# Patient Record
Sex: FEMALE | Race: OTHER | NOT HISPANIC OR LATINO | URBAN - METROPOLITAN AREA
[De-identification: names, ages, dates, MRNs, and addresses within clinical notes are randomized per-mention and may not be internally consistent; named-entity substitution may affect disease eponyms.]

---

## 2023-12-14 ENCOUNTER — TELEPHONE (OUTPATIENT)
Dept: PRIMARY CARE | Facility: CLINIC | Age: 23
End: 2023-12-14
Payer: COMMERCIAL

## 2023-12-14 NOTE — TELEPHONE ENCOUNTER
Pt tested positive for Covid 2 weeks ago and called today to  say she tested positive 2 weeks ago, but needs a letter for School and work  excusing her for being out for 2 weeks. I  Informed pt that you may not do the letter giving we were not informed when she first tested positive also you have not seen her in over a year but I would check with you to see what you said

## 2024-01-08 ENCOUNTER — APPOINTMENT (OUTPATIENT)
Dept: DERMATOLOGY | Facility: CLINIC | Age: 24
End: 2024-01-08
Payer: COMMERCIAL

## 2024-01-31 ENCOUNTER — OFFICE VISIT (OUTPATIENT)
Dept: PRIMARY CARE | Facility: CLINIC | Age: 24
End: 2024-01-31
Payer: COMMERCIAL

## 2024-01-31 VITALS
WEIGHT: 151.6 LBS | HEART RATE: 66 BPM | DIASTOLIC BLOOD PRESSURE: 78 MMHG | TEMPERATURE: 97.7 F | BODY MASS INDEX: 22.97 KG/M2 | SYSTOLIC BLOOD PRESSURE: 110 MMHG | HEIGHT: 68 IN

## 2024-01-31 DIAGNOSIS — Z20.2 STD EXPOSURE: ICD-10-CM

## 2024-01-31 DIAGNOSIS — Z01.419 ENCOUNTER FOR GYNECOLOGICAL EXAMINATION WITHOUT ABNORMAL FINDING: Primary | ICD-10-CM

## 2024-01-31 DIAGNOSIS — Z00.00 ROUTINE GENERAL MEDICAL EXAMINATION AT A HEALTH CARE FACILITY: ICD-10-CM

## 2024-01-31 DIAGNOSIS — E55.9 VITAMIN D DEFICIENCY: ICD-10-CM

## 2024-01-31 LAB
25(OH)D3 SERPL-MCNC: 21 NG/ML (ref 30–100)
ALBUMIN SERPL BCP-MCNC: 4.5 G/DL (ref 3.4–5)
ALP SERPL-CCNC: 46 U/L (ref 33–110)
ALT SERPL W P-5'-P-CCNC: 12 U/L (ref 7–45)
ANION GAP SERPL CALC-SCNC: 12 MMOL/L (ref 10–20)
AST SERPL W P-5'-P-CCNC: 17 U/L (ref 9–39)
BASOPHILS # BLD AUTO: 0.03 X10*3/UL (ref 0–0.1)
BASOPHILS NFR BLD AUTO: 0.6 %
BILIRUB SERPL-MCNC: 1.2 MG/DL (ref 0–1.2)
BUN SERPL-MCNC: 8 MG/DL (ref 6–23)
CALCIUM SERPL-MCNC: 9.5 MG/DL (ref 8.6–10.6)
CHLORIDE SERPL-SCNC: 104 MMOL/L (ref 98–107)
CHOLEST SERPL-MCNC: 161 MG/DL (ref 0–199)
CHOLESTEROL/HDL RATIO: 3.5
CO2 SERPL-SCNC: 25 MMOL/L (ref 21–32)
CREAT SERPL-MCNC: 0.6 MG/DL (ref 0.5–1.05)
EGFRCR SERPLBLD CKD-EPI 2021: >90 ML/MIN/1.73M*2
EOSINOPHIL # BLD AUTO: 0.29 X10*3/UL (ref 0–0.7)
EOSINOPHIL NFR BLD AUTO: 5.9 %
ERYTHROCYTE [DISTWIDTH] IN BLOOD BY AUTOMATED COUNT: 13.6 % (ref 11.5–14.5)
GLUCOSE SERPL-MCNC: 73 MG/DL (ref 74–99)
HBV SURFACE AG SERPL QL IA: NONREACTIVE
HCT VFR BLD AUTO: 35.3 % (ref 36–46)
HCV AB SER QL: NONREACTIVE
HDLC SERPL-MCNC: 45.5 MG/DL
HGB BLD-MCNC: 11.6 G/DL (ref 12–16)
IMM GRANULOCYTES # BLD AUTO: 0 X10*3/UL (ref 0–0.7)
IMM GRANULOCYTES NFR BLD AUTO: 0 % (ref 0–0.9)
LDLC SERPL CALC-MCNC: 104 MG/DL
LYMPHOCYTES # BLD AUTO: 1.65 X10*3/UL (ref 1.2–4.8)
LYMPHOCYTES NFR BLD AUTO: 33.6 %
MCH RBC QN AUTO: 27 PG (ref 26–34)
MCHC RBC AUTO-ENTMCNC: 32.9 G/DL (ref 32–36)
MCV RBC AUTO: 82 FL (ref 80–100)
MONOCYTES # BLD AUTO: 0.33 X10*3/UL (ref 0.1–1)
MONOCYTES NFR BLD AUTO: 6.7 %
NEUTROPHILS # BLD AUTO: 2.61 X10*3/UL (ref 1.2–7.7)
NEUTROPHILS NFR BLD AUTO: 53.2 %
NON HDL CHOLESTEROL: 116 MG/DL (ref 0–149)
NRBC BLD-RTO: 0 /100 WBCS (ref 0–0)
PLATELET # BLD AUTO: 263 X10*3/UL (ref 150–450)
POTASSIUM SERPL-SCNC: 4.2 MMOL/L (ref 3.5–5.3)
PREGNANCY TEST URINE, POC: NEGATIVE
PROT SERPL-MCNC: 7.3 G/DL (ref 6.4–8.2)
RBC # BLD AUTO: 4.29 X10*6/UL (ref 4–5.2)
SODIUM SERPL-SCNC: 137 MMOL/L (ref 136–145)
TREPONEMA PALLIDUM IGG+IGM AB [PRESENCE] IN SERUM OR PLASMA BY IMMUNOASSAY: NONREACTIVE
TRIGL SERPL-MCNC: 56 MG/DL (ref 0–149)
TSH SERPL-ACNC: 1.98 MIU/L (ref 0.44–3.98)
VLDL: 11 MG/DL (ref 0–40)
WBC # BLD AUTO: 4.9 X10*3/UL (ref 4.4–11.3)

## 2024-01-31 PROCEDURE — 99395 PREV VISIT EST AGE 18-39: CPT | Performed by: FAMILY MEDICINE

## 2024-01-31 PROCEDURE — 82306 VITAMIN D 25 HYDROXY: CPT

## 2024-01-31 PROCEDURE — 81025 URINE PREGNANCY TEST: CPT | Performed by: FAMILY MEDICINE

## 2024-01-31 PROCEDURE — 84443 ASSAY THYROID STIM HORMONE: CPT

## 2024-01-31 PROCEDURE — 86803 HEPATITIS C AB TEST: CPT

## 2024-01-31 PROCEDURE — 87340 HEPATITIS B SURFACE AG IA: CPT

## 2024-01-31 PROCEDURE — 87389 HIV-1 AG W/HIV-1&-2 AB AG IA: CPT

## 2024-01-31 PROCEDURE — 86780 TREPONEMA PALLIDUM: CPT

## 2024-01-31 PROCEDURE — 1036F TOBACCO NON-USER: CPT | Performed by: FAMILY MEDICINE

## 2024-01-31 PROCEDURE — 80061 LIPID PANEL: CPT

## 2024-01-31 PROCEDURE — 87800 DETECT AGNT MULT DNA DIREC: CPT

## 2024-01-31 PROCEDURE — 80053 COMPREHEN METABOLIC PANEL: CPT

## 2024-01-31 PROCEDURE — 85025 COMPLETE CBC W/AUTO DIFF WBC: CPT

## 2024-01-31 PROCEDURE — 36415 COLL VENOUS BLD VENIPUNCTURE: CPT

## 2024-01-31 NOTE — PATIENT INSTRUCTIONS
You mention that you have severe cramps and lower back pain during your menstrual    It can be that you are not physically active and you are producing too much prostaglandin during your menstrual and that can be fixed to a great extent by being more physically active meaning good aerobic exercise at least jogging or going on the elliptical for 45 minutes to 1 hour for 5 days a week    My other thought is you might be having endometriosis     Please asked the question from the gynecologist whether there is not any indication that you may be suffering from endometriosis since you have severe cramps and lower back pain with your menstrual

## 2024-01-31 NOTE — PROGRESS NOTES
"Subjective   Patient ID: Colleen Salomon is a 23 y.o. female who presents for Annual Exam.    HPI     Review of Systems    Objective   /78   Pulse 66   Temp 36.5 °C (97.7 °F)   Ht 1.73 m (5' 8.11\")   Wt 68.8 kg (151 lb 9.6 oz)   LMP 01/06/2024   BMI 22.98 kg/m²     Physical Exam  Cardiovascular:      Rate and Rhythm: Normal rate.   Pulmonary:      Breath sounds: Normal breath sounds.   Abdominal:      Palpations: Abdomen is soft.   Musculoskeletal:         General: Normal range of motion.      Cervical back: Normal range of motion.   Skin:     General: Skin is warm.   Neurological:      General: No focal deficit present.      Mental Status: She is alert.   Psychiatric:         Mood and Affect: Mood normal.         Assessment/Plan       This is a 23-year-old female patient here for her annual physical    We talked about safe sex and I referred her to gynecology    She has severe dysmenorrhea with back pain I educated the patient to find out from the gynecologist whether she should be ruled out for endometriosis    She will be seeing a dermatologist for her acne    Routine labs were drawn she also wanted a pregnancy test which came back negative         "

## 2024-02-01 LAB
C TRACH RRNA SPEC QL NAA+PROBE: NEGATIVE
HIV 1+2 AB+HIV1 P24 AG SERPL QL IA: NONREACTIVE
N GONORRHOEA DNA SPEC QL PROBE+SIG AMP: NEGATIVE

## 2024-02-12 ENCOUNTER — APPOINTMENT (OUTPATIENT)
Dept: DERMATOLOGY | Facility: CLINIC | Age: 24
End: 2024-02-12
Payer: COMMERCIAL

## 2024-02-28 ENCOUNTER — OFFICE VISIT (OUTPATIENT)
Dept: DERMATOLOGY | Facility: CLINIC | Age: 24
End: 2024-02-28
Payer: COMMERCIAL

## 2024-02-28 DIAGNOSIS — L70.0 ACNE VULGARIS: Primary | ICD-10-CM

## 2024-02-28 PROCEDURE — 1036F TOBACCO NON-USER: CPT | Performed by: DERMATOLOGY

## 2024-02-28 PROCEDURE — 99214 OFFICE O/P EST MOD 30 MIN: CPT | Performed by: DERMATOLOGY

## 2024-02-28 RX ORDER — DAPSONE 75 MG/G
GEL TOPICAL
Qty: 60 G | Refills: 3 | Status: SHIPPED | OUTPATIENT
Start: 2024-02-28

## 2024-02-28 RX ORDER — SPIRONOLACTONE 50 MG/1
TABLET, FILM COATED ORAL
Qty: 30 TABLET | Refills: 3 | Status: CANCELLED | OUTPATIENT
Start: 2024-02-28

## 2024-02-28 RX ORDER — AZELAIC ACID 0.15 G/G
GEL TOPICAL
Qty: 50 G | Refills: 11 | Status: SHIPPED | OUTPATIENT
Start: 2024-02-28

## 2024-02-28 RX ORDER — SPIRONOLACTONE 50 MG/1
TABLET, FILM COATED ORAL
Qty: 30 TABLET | Refills: 3 | Status: SHIPPED | OUTPATIENT
Start: 2024-02-28

## 2024-02-28 RX ORDER — VALACYCLOVIR HYDROCHLORIDE 1 G/1
TABLET, FILM COATED ORAL
COMMUNITY
Start: 2020-10-15

## 2024-02-28 RX ORDER — CLASCOTERONE 1 G/100G
CREAM TOPICAL
Qty: 60 G | Refills: 11 | Status: CANCELLED | OUTPATIENT
Start: 2024-02-28

## 2024-02-28 ASSESSMENT — ITCH NUMERIC RATING SCALE: HOW SEVERE IS YOUR ITCHING?: 0

## 2024-02-28 NOTE — PROGRESS NOTES
Navin Salomon is a 23 y.o. female who presents for the following: Acne (Face/back/chest- pt states has tried in the past-bp wash, clinda lotion, tretinoin and clinda bp gel with no response. States currently using azelaic acid and xinc pyrithione 0.25% from [facility]. Pt states has improved dark spots but hasn't helped with the acne. ).     Review of Systems:  No other skin or systemic complaints other than what is documented elsewhere in the note.    The following portions of the chart were reviewed this encounter and updated as appropriate:   Tobacco  Allergies  Meds  Problems  Med Hx  Surg Hx  Fam Hx         Skin Cancer History  No skin cancer on file.      Specialty Problems    None       Objective   Well appearing patient in no apparent distress; mood and affect are within normal limits.    A focused skin examination was performed. All findings within normal limits unless otherwise noted below.    Assessment/Plan   1. Acne vulgaris  Head - Anterior (Face)  Full face with numerous small inflammatory papules, some comedonal; PIH    Inflammatory acne, likely with hormonal component (worsens prior to menses, recalcitrant to therapy). Patient had acne in thor school, which was well controlled with azelaic acid/clindamycin/niacin previously. Then acne recurred a few years ago, and patient tried BP wash, clindamycin lotion and tretinoin 0.025% for greater than 1 year without response, then tried reduced strength tretinoin 0.018% and this only resulted in worsening of acne.  For the past few months patient has been on azelaic acid/clinda/zinc pyrithione from [facility] with mild improvement but still with significant active acne.  -Discussed the nature of the condition  -Discussed treatment options  -Avoid tretinoin in this patient given past intolerance and ineffectiveness  -Recommend to begin topical azelaic acid 15% once daily  -Recommend to begin topical dapsone 7.5% once daily  -Recommend to  start spironolactone 50mg at bedtime as tolerated      -Discussed/information given on the potential adverse effects of spironolactone including but not limited to hypotension, diuresis, muscle cramps, fatigue, breast tenderness, menstrual irregularities, hyperkalemia which may result in muscle dysfunction (although current evidence suggests no need to monitor potassium levels in young healthy adult females).   -Discussed common side effects of lightheadedness or dizziness, which usually resolve within a few days as the body adjusts to the medication if fluid intake is appropriate. Discussed if the patient is feeling unwell or fainting, to discontinue the medication and contact our office.  -Discussed not to take any prescription potassium supplements while taking spironolactone, and to avoid excessive consumption of food/drink containing potassium (such as coconut water).  -There is a black box warning of tumorigenesis in rodents taking very high doses of spironolactone. Epidemiologic studies have shown no increase in malignancy in humans.   -If patient is of child bearing potential, patient counseled that they should NOT get pregnant while on this medication as there is risk of teratogenicity/birth defects and needs to use contraception while taking this medication      spironolactone (Aldactone) 50 mg tablet - Head - Anterior (Face)  Take one pill by mouth at bedtime as tolerated    Related Medications  azelaic acid (Finacea) 15 % gel  Apply to affected areas once daily    dapsone 7.5 % gel with pump  Apply to affected areas once daily        Follow up in 3 months for acne  Discussed if there are any changes or development of concerning symptoms (lesion/skin condition is changing, bleeding, enlarging, or worsening) the patient is to contact my office. The patient verbalizes understanding.    Shey Grover MD  2/28/2024

## 2024-03-04 ENCOUNTER — APPOINTMENT (OUTPATIENT)
Dept: AUDIOLOGY | Facility: CLINIC | Age: 24
End: 2024-03-04
Payer: COMMERCIAL

## 2024-03-04 ENCOUNTER — TELEPHONE (OUTPATIENT)
Dept: DERMATOLOGY | Facility: CLINIC | Age: 24
End: 2024-03-04

## 2024-03-04 ENCOUNTER — APPOINTMENT (OUTPATIENT)
Dept: OTOLARYNGOLOGY | Facility: CLINIC | Age: 24
End: 2024-03-04
Payer: COMMERCIAL

## 2024-03-04 NOTE — TELEPHONE ENCOUNTER
Pt left VM stating that she will be fasting for one month for [employer] reasons starting next Monday, so she will not be able to eat or drink from sunrise to sunset and would like to know if she should hold off on starting the Spironolactone until her fasting period ends. Please advise?     Thanks!  Azul Taylor

## 2024-03-05 ENCOUNTER — TELEPHONE (OUTPATIENT)
Dept: DERMATOLOGY | Facility: CLINIC | Age: 24
End: 2024-03-05
Payer: COMMERCIAL

## 2024-03-05 NOTE — TELEPHONE ENCOUNTER
Pt was contacted at this time and was made aware that pharmacy was notified to fill medications as well.  Pt was told that she can wait to start spironolactone until after her fast.    Dalton Mariscal LPN

## 2024-03-05 NOTE — TELEPHONE ENCOUNTER
----- Message from Azul Taylor RN sent at 3/4/2024  3:18 PM EST -----  Regarding: Topicals  Hi!     This patient called regarding her topicals that were prescribed by Dr. Berrios- Dapcarey and Azelaic acid- she stated that per the pharmacy they are delayed. Did these require prior auths and do you know if they were approved?    Thanks!!!  Azul Taylor RN     The prior auths for these medications have been completed. Pharmacy was called and notified that these medications can be filled.     Dalton Mariscal LPN

## 2024-03-06 ENCOUNTER — TELEPHONE (OUTPATIENT)
Dept: DERMATOLOGY | Facility: CLINIC | Age: 24
End: 2024-03-06
Payer: COMMERCIAL

## 2024-03-06 NOTE — TELEPHONE ENCOUNTER
Pt contacted office and left message with questions regarding medications.  This nurse was able to call patient back and discuss these questions. All questions answered appropriately. No further questions noted.     Dalton Mariscal LPN

## 2024-06-04 ENCOUNTER — OFFICE VISIT (OUTPATIENT)
Dept: OTOLARYNGOLOGY | Facility: CLINIC | Age: 24
End: 2024-06-04
Payer: COMMERCIAL

## 2024-06-04 ENCOUNTER — TELEPHONE (OUTPATIENT)
Dept: DERMATOLOGY | Facility: CLINIC | Age: 24
End: 2024-06-04

## 2024-06-04 ENCOUNTER — CLINICAL SUPPORT (OUTPATIENT)
Dept: AUDIOLOGY | Facility: CLINIC | Age: 24
End: 2024-06-04
Payer: COMMERCIAL

## 2024-06-04 VITALS — SYSTOLIC BLOOD PRESSURE: 112 MMHG | DIASTOLIC BLOOD PRESSURE: 61 MMHG | TEMPERATURE: 97.2 F

## 2024-06-04 DIAGNOSIS — H93.293 ABNORMAL AUDITORY PERCEPTION OF BOTH EARS: ICD-10-CM

## 2024-06-04 DIAGNOSIS — H93.13 TINNITUS OF BOTH EARS: ICD-10-CM

## 2024-06-04 DIAGNOSIS — H93.8X3 EAR FULLNESS, BILATERAL: Primary | ICD-10-CM

## 2024-06-04 DIAGNOSIS — M26.609 TMJ DYSFUNCTION: ICD-10-CM

## 2024-06-04 DIAGNOSIS — H61.22 LEFT EAR IMPACTED CERUMEN: Primary | ICD-10-CM

## 2024-06-04 PROCEDURE — 92550 TYMPANOMETRY & REFLEX THRESH: CPT | Performed by: AUDIOLOGIST

## 2024-06-04 PROCEDURE — 92557 COMPREHENSIVE HEARING TEST: CPT | Performed by: AUDIOLOGIST

## 2024-06-04 ASSESSMENT — PATIENT HEALTH QUESTIONNAIRE - PHQ9
SUM OF ALL RESPONSES TO PHQ9 QUESTIONS 1 AND 2: 0
1. LITTLE INTEREST OR PLEASURE IN DOING THINGS: NOT AT ALL
2. FEELING DOWN, DEPRESSED OR HOPELESS: NOT AT ALL

## 2024-06-04 ASSESSMENT — ENCOUNTER SYMPTOMS: OCCASIONAL FEELINGS OF UNSTEADINESS: 0

## 2024-06-04 ASSESSMENT — PAIN SCALES - GENERAL: PAINLEVEL_OUTOF10: 0 - NO PAIN

## 2024-06-04 ASSESSMENT — PAIN - FUNCTIONAL ASSESSMENT: PAIN_FUNCTIONAL_ASSESSMENT: 0-10

## 2024-06-04 NOTE — PROGRESS NOTES
Patient ID: Colleen Salomon is a 23 y.o. female who presents for the evaluation of hearing difficulty in both ears.     PROVIDER IMPRESSIONS:  DIAGNOSES/PROBLEMS:  -TMJ dysfunction  -Left cerumen impaction    ASSESSMENT:   Colleen Salomon is a pleasant 23 y.o. female who presents with symptoms of bilateral hearing difficulty, intermittent bilateral aural fullness/pressure, intermittent bilateral ear itching, and intermittent bilateral non-pulsatile tinnitus. Based on the clinical information provided, symptoms and clinical exam findings are consistent with TMJ dysfunction and left cerumen impaction. Reassurance provided to patient that exam today showed no evidence of acute infection or inflammation in the EAC bilaterally and that TM appears with no evidence of infection, effusion, retraction or perforation bilaterally.  Audiogram reviewed in detail with the patient, which revealed normal hearing function results throughout with excellent word discrimination bilaterally. The etiology of temporomandibular joint disorders (TMD) was discussed in detail with patient including: structural issues such as alterations in dental occlusion (the alignment of the upper and lower teeth) that affect maxillomandibular position and function. These issues may or may not be associated with joint trauma, poor posture or cervicogenic etiologies. Possible psychologic factors include anxiety, depression and PTSD. Multiple other contributing and comorbid factors, including biological, behavioral, environmental, and cognitive disorders which can all contribute to the development of symptoms were also reviewed with the patient.      PLAN:  I counseled patient on safe interventions for cerumen management at home. Patient may wash the external ear with a cloth. I reinforced education to the patient that they should avoid using cotton tipped applicators, tissues, paper, or any rigid object in the ear canal. I explained to the patient that doing so  may cause wax to be pushed back into the ear canal and stuck and also risks injury to the ear canal and ear drum.   Patient advised to wear ear hearing protection while in the presence of loud sounds. The patient was also counseled to use effective communication strategies such as asking the speaker to gain attention prior to speaking, speaking in the same room, repeating words that were heard, etc.   I recommended patient initiates TMJ home management strategies which were reviewed in detail.   Follow-up: Patient may schedule for follow-up as needed. They may follow-up sooner, if needed. Patient is agreeable to this plan, all questions were answered to patient's satisfaction.     Subjective   HPI: Colleen Salomon is a 23 y.o. female who presents for the evaluation of bilateral hearing difficulty.  The patient states that symptom onset began a couple of months ago. Reports that she is currently in pre-dentistry track for college at [employer] and has noticed she cannot hear words as well as others when communicating, especially in loud/noisy environments. Associated symptoms include intermittent bilateral ear itching, intermittent ear fullness/pressure in alternating ears and occasional non-pulsatile ringing/buzzing sound in the ears for the past few months which is non-bothersome. Denies the presence of ear pain, ear drainage, autophony, dizziness or vertigo. Mentions history of known teeth-grinding which she has been more aware of trying to avoid throughout the day in the past few months. When asked about pertinent otologic history, the patient denies a history of recurrent ear infections, denies history of ear surgery, denies history of PE tube insertion, and denies history of prolonged/traumatic loud noise exposure. The patient does insert Q-tips in the ear canals once weekly for ear itching, and  denies insertion of other foreign objects into the ear canals. The patient denies history of wearing  hearing aid devices. The patient does not endorse a family history of hearing loss. Patient was previously established with Dr. Jonathon Frankel and underwent OSR in March 2021.    PATIENT HISTORY:  No past medical history on file.   No past surgical history on file.   No Known Allergies     Current Outpatient Medications:     azelaic acid (Finacea) 15 % gel, Apply to affected areas once daily, Disp: 50 g, Rfl: 11    dapsone 7.5 % gel with pump, Apply to affected areas once daily, Disp: 60 g, Rfl: 3    spironolactone (Aldactone) 50 mg tablet, Take one pill by mouth at bedtime as tolerated, Disp: 30 tablet, Rfl: 3    valACYclovir (Valtrex) 1 gram tablet, Take by mouth once daily., Disp: , Rfl:    Tobacco Use: Low Risk  (2/28/2024)    Patient History     Smoking Tobacco Use: Never     Smokeless Tobacco Use: Never     Passive Exposure: Not on file      Alcohol Use: Not At Risk (10/11/2019)    Received from [facility]    AUDIT-C     Frequency of Alcohol Consumption: Never     Average Number of Drinks: Not on file     Frequency of Binge Drinking: Not on file      Social History     Substance and Sexual Activity   Drug Use Never        Review of Systems   All other systems negative.     Objective   Visit Vitals  OB Status Having periods   Smoking Status Never        PHYSICAL EXAM:  General appearance: Appears well, well-nourished, well groomed. No acute distress.   Constitutional: No fever, chills, weight loss or weight gain.  Communication: Normal communication  Psychiatric: Oriented to person, place and time. Normal mood and affect.  Neurologic: Cranial nerves II-XII grossly intact and symmetric bilaterally.  Cardiovascular: Examination of peripheral vascular system shows no clubbing or cyanosis.  Respiratory: No respiratory distress increased work of breathing. Inspection of the chest with symmetric chest expansion and normal respiratory effort.  Skin: No head and neck rashes.  Head: Normocephalic. Atraumatic  with no masses, lesions or scarring.  Face: Normal symmetry. No scars or deformities.  Eyes: Conjunctiva not edematous or erythematous. PERRLA  Neck: Supple and symmetric, trachea midline. Lymph nodes with no adenopathy.  Head: Normocephalic. Atraumatic with no masses, lesions or scarring.  Eyes: PERRL, EOMI, Conjunctiva is clear. No nystagmus.  Nose: External inspection of nose: No nasal lesions, lacerations or scars.  Throat:  Floor of mouth is clear, no masses.  Tongue appears normal, no lesions or masses. Gums, gingiva, buccal mucosa appear pink and moist, no lesions. Teeth are in intact.    Oropharynx: No lesions. Retropharyngeal wall is flat.  Salivary Glands: Symmetric bilaterally.  No palpable masses.  No evidence of acute infection or salivary stones.  TMJ: Normal, no trismus.  Right Ear: External inspection of ear with no deformity, scars, or masses. Mastoid is nontender. External auditory canal is clear. TM is intact with no sign of infection, effusion, or retraction.  No perforation seen. Auto insufflation visible under microscopy.  Left Ear: External inspection of ear with no deformity, scars, or masses. Mastoid is nontender. External auditory canal is partially impacted with cerumen. Unable to visualize TM.     RESULTS:  I personally reviewed the patient's audiogram today from 6/4/24, which showed: Normal hearing across all frequencies in bilateral ears. Normal tympanogram bilaterally. Excellent word discrimination bilaterally. Acoustic reflexes present right ipsilateral, and left ipsilateral. Bilateral OAEs present and robust from 0918-7477 Hz.     Procedures   EAR CLEANING PROCEDURE NOTE:  Indication: Cerumen impaction  Location: left ear canals  Procedure Note: The procedure was performed by the provider.  Visualization Instrument: A microscope with a #5 speculum was placed in the ear canals to visualize the ear canal debris.  Ear Cleaning Instrument and Outcome: Using the 5 suction, a small amount  of soft, yellow cerumen was removed from the impacted EAC(s).  Post-Procedure/Microscopic Otologic Exam:  Left Ear-- TM is intact with no sign of infection, effusion, or retraction.  No perforation seen. EAC is clear. Auto insufflation visible under microscopy.  Patient Status: The patient tolerated the procedure well.  Complications: There were no complications.     Amanda Blank, APRN-CNP

## 2024-06-04 NOTE — TELEPHONE ENCOUNTER
Pt contacted office at this time and states that she is unable to come to her appointment on 06/05/2024.  Pt has already cancelled appointment and has rescheduled to see Dr. Weathers in August.  Pt would like to have [facility] staff contact patient to reschedule with Dr. Berrios if possible to a sooner appointment.    Can someone please reach out to patient to schedule appointment with Dr. Yash Grover?    Dalton Mariscal LPN

## 2024-06-04 NOTE — PROGRESS NOTES
AUDIOLOGY ADULT AUDIOMETRIC EVALUATION      Name:  Colleen Salomon  :  2000  Age:  23 y.o.  Date of Evaluation: 24    History:  Reason for visit:  Ms. Salomon was seen today as part of the visit with CRISTHIAN Caceres  for an evaluation of hearing.   Chief Complaint   Patient presents with    Hearing Problem      Stated she has been concerned for a gradual hearing loss, occurring within the past few months. Stated she has noticed difficulty hearing while attending college. Stated she has been asking speakers to repeat and has been missing words. She has noticed difficulty hearing in the presence of background noise, however, has noticed some problems in quiet.   Reported occasional sensations of bilateral ear fullness and popping sounds in the ear.   Stated she has noticed some slight dizziness, however, not vertigo or recent falls.   Denied any current otalgia, aural fullness, tinnitus, ear pressure, ear surgery, recent ear/sinus infections, recent falls, significant noise exposure, sinus/throat concerns, ear drainage, or sudden hearing loss.    EVALUATION     See Audiogram    RESULTS:    Otoscopic Evaluation:   Right Ear: Otoscopy revealed a clear healthy canal and a healthy tympanic membrane was visualized.   Left Ear:  Otoscopy revealed a clear healthy canal and a healthy tympanic membrane was visualized.     Immittance:  Immittance Measures: 226 Hz   Right Ear: Tympanometric testing revealed a normal type A tympanogram with normal middle ear pressure and normal static compliance.  Left Ear: Tympanometric testing revealed a normal type A tympanogram with normal middle ear pressure and normal static compliance.    Ipsilateral acoustic reflexes were present at, 500-4,000 Hz, at expected sensation levels.  Ipsilateral acoustic reflexes were present at, 500-4,000 Hz, at expected sensation levels.    Test technique:  Pure Tone Audiometry via TDH headphones    Reliability:   excellent    Pure Tone  Audiometry:    Right Ear: Audiometric testing indicated normal peripheral hearing sensitivity from 125-8,000 Hz.  Left Ear:   Audiometric testing indicated normal peripheral hearing sensitivity from 125-8,000 Hz.      Speech Audiometry:   Right Ear:  Speech Reception Threshold (SRT) was obtained at 10 dBHL                 Word Recognition scores were excellent (100%) in quiet when words were presented at 50 dBHL  Left Ear:  Speech Reception Threshold (SRT) was obtained at 5 dBHL                 Word Recognition scores were excellent (100%) in quiet when words were presented at 45 dBHL  Testing was performed with recorded NU-6 speech words in quiet. Speech thresholds were in good agreement with the pure tone averages in each ear.     Distortion Product Otoacoustic Emissions:  Right Ear: Distortion product otoacoustic emissions were present and robust from 1,000-8,000 Hz, indicating normal to near normal cochlear outer hair cell function at these frequencies.  Left Ear:  Distortion product otoacoustic emissions were present and robust from 1,000-8,000 Hz, indicating normal to near normal cochlear outer hair cell function at these frequencies.  Present OAEs suggest normal cochlear outer hair cell function.  Absent OAEs are consistent with some degree of hearing loss.    IMPRESSIONS:  Today's test results are consistent with normal peripheral hearing sensitivity, bilaterally.  The patient was counseled with regard to the findings.    RECOMMENDATIONS:  * Continue medical follow up with CRISTHIAN Caceres.  * Retest as medically indicated, or if future concerns for hearing sensitivity arise.   * Wear hearing protection while in the presence of loud sounds.   * Use effective communication strategies such as asking the speaker to gain attention prior to speaking, speaking in the same room, repeating words that were heard, etc.    PATIENT EDUCATION:   Discussed results and recommendations with the patient and a  copy of the hearing test was provided.  Questions were addressed and the patient was encouraged to contact our department should concerns arise.  The patient was seen from  10:30-11:00 am.

## 2024-06-05 ENCOUNTER — APPOINTMENT (OUTPATIENT)
Dept: DERMATOLOGY | Facility: CLINIC | Age: 24
End: 2024-06-05
Payer: COMMERCIAL

## 2024-06-10 ENCOUNTER — APPOINTMENT (OUTPATIENT)
Dept: DERMATOLOGY | Facility: CLINIC | Age: 24
End: 2024-06-10
Payer: COMMERCIAL

## 2024-07-04 DIAGNOSIS — L70.0 ACNE VULGARIS: ICD-10-CM

## 2024-07-05 RX ORDER — SPIRONOLACTONE 50 MG/1
TABLET, FILM COATED ORAL
Qty: 30 TABLET | Refills: 0 | Status: SHIPPED | OUTPATIENT
Start: 2024-07-05

## 2024-08-13 ENCOUNTER — APPOINTMENT (OUTPATIENT)
Dept: DERMATOLOGY | Facility: CLINIC | Age: 24
End: 2024-08-13
Payer: COMMERCIAL

## 2024-08-15 ENCOUNTER — TELEPHONE (OUTPATIENT)
Dept: DERMATOLOGY | Facility: CLINIC | Age: 24
End: 2024-08-15
Payer: COMMERCIAL

## 2024-08-15 NOTE — TELEPHONE ENCOUNTER
LVM for pt at this time to notify her that appointment is needed in order to receive refills. Informed pt that scheduling will reach out to her to schedule an appointment with Dr. Berrios. Call back number left.     Azul Taylor RN

## 2024-08-15 NOTE — TELEPHONE ENCOUNTER
Pt contacted office requesting a refill of spironolactone to a new pharmacy. Pt was last seen 02/28/2024 and advised to follow up in 3 months. Pt cancelled follow up scheduled for 06/05, 06/10 and no showed appointment on 08/13 with Dr. Weathers. No current follow up scheduled at this time.     Can you please review and send refill to the [facility] in chart if appropriate?     Thanks!  Azul Taylor RN

## 2024-08-20 ENCOUNTER — APPOINTMENT (OUTPATIENT)
Dept: DERMATOLOGY | Facility: CLINIC | Age: 24
End: 2024-08-20
Payer: COMMERCIAL

## 2024-08-20 DIAGNOSIS — L70.0 ACNE VULGARIS: Primary | ICD-10-CM

## 2024-08-20 PROCEDURE — 1036F TOBACCO NON-USER: CPT | Performed by: DERMATOLOGY

## 2024-08-20 PROCEDURE — 99214 OFFICE O/P EST MOD 30 MIN: CPT | Performed by: DERMATOLOGY

## 2024-08-20 RX ORDER — SPIRONOLACTONE 50 MG/1
TABLET, FILM COATED ORAL
Qty: 45 TABLET | Refills: 5 | Status: SHIPPED | OUTPATIENT
Start: 2024-08-20

## 2024-08-20 NOTE — PROGRESS NOTES
Subjective     Colleen Salomon is a 23 y.o. female who presents for the following: Acne (Pt presents for acne follow up. Pt states she is continuing to use the azelaic acid and dapsone with good response. She states she is continuing to take the spironolactone as prescribed. She states she feels that her skin as cleared up but she does continue to get break outs around her nose/cheeks. ).     Review of Systems:  No other skin or systemic complaints other than what is documented elsewhere in the note.    The following portions of the chart were reviewed this encounter and updated as appropriate:   Tobacco  Allergies  Meds  Problems  Med Hx  Surg Hx  Fam Hx         Skin Cancer History  No skin cancer on file.      Specialty Problems    None       Objective   Well appearing patient in no apparent distress; mood and affect are within normal limits.    A focused skin examination was performed. All findings within normal limits unless otherwise noted below.    Assessment/Plan   1. Acne vulgaris  Head - Anterior (Face)  Full face with a few inflammatory papules on lower face, some residual comedones; PIH    Improved but inadequately controlled  -Recommend to continue topical azelaic acid 15% once daily  -Recommend to continue topical dapsone 7.5% once daily  -Recommend to increase dose of spironolactone from 50mg daily to 75mg once daily as tolerated. Patient tolerating well without side effects.    Prior History:  Inflammatory acne, likely with hormonal component (worsens prior to menses, recalcitrant to therapy). Patient had acne in high school, which was well controlled with azelaic acid/clindamycin/niacin previously. Then acne recurred a few years ago, and patient tried BP wash, clindamycin lotion and tretinoin 0.025% for greater than 1 year without response, then tried reduced strength tretinoin 0.018% and this only resulted in worsening of acne.  For the past few months patient has been on azelaic  acid/clinda/zinc pyrithione from curology with mild improvement but still with significant active acne.  -Discussed the nature of the condition  -Discussed treatment options  -Avoid tretinoin in this patient given past intolerance and ineffectiveness    -Discussed/information given on the potential adverse effects of spironolactone including but not limited to hypotension, diuresis, muscle cramps, fatigue, breast tenderness, menstrual irregularities, hyperkalemia which may result in muscle dysfunction (although current evidence suggests no need to monitor potassium levels in young healthy adult females).   -Discussed common side effects of lightheadedness or dizziness, which usually resolve within a few days as the body adjusts to the medication if fluid intake is appropriate. Discussed if the patient is feeling unwell or fainting, to discontinue the medication and contact our office.  -Discussed not to take any prescription potassium supplements while taking spironolactone, and to avoid excessive consumption of food/drink containing potassium (such as coconut water).  -There is a black box warning of tumorigenesis in rodents taking very high doses of spironolactone. Epidemiologic studies have shown no increase in malignancy in humans.   -If patient is of child bearing potential, patient counseled that they should NOT get pregnant while on this medication as there is risk of teratogenicity/birth defects and needs to use contraception while taking this medication      Related Medications  azelaic acid (Finacea) 15 % gel  Apply to affected areas once daily    dapsone 7.5 % gel with pump  Apply to affected areas once daily    spironolactone (Aldactone) 50 mg tablet  Take one and a half pills (75mg) by mouth at bedtime as tolerated        Follow up in 6 months for acne  Discussed if there are any changes or development of concerning symptoms (lesion/skin condition is changing, bleeding, enlarging, or worsening) the  patient is to contact my office. The patient verbalizes understanding.    Shey Grover MD  8/20/2024

## 2025-02-24 ENCOUNTER — APPOINTMENT (OUTPATIENT)
Dept: DERMATOLOGY | Facility: CLINIC | Age: 25
End: 2025-02-24
Payer: COMMERCIAL

## 2025-02-24 DIAGNOSIS — L70.0 ACNE VULGARIS: Primary | ICD-10-CM

## 2025-02-24 DIAGNOSIS — B00.1 HERPES LABIALIS: ICD-10-CM

## 2025-02-24 DIAGNOSIS — L98.9 DERMATOLOGIC PROBLEM: ICD-10-CM

## 2025-02-24 DIAGNOSIS — L81.9 DYSCHROMIA: ICD-10-CM

## 2025-02-24 PROCEDURE — 99214 OFFICE O/P EST MOD 30 MIN: CPT | Performed by: DERMATOLOGY

## 2025-02-24 PROCEDURE — 1036F TOBACCO NON-USER: CPT | Performed by: DERMATOLOGY

## 2025-02-24 RX ORDER — SPIRONOLACTONE 100 MG/1
TABLET, FILM COATED ORAL
Qty: 90 TABLET | Refills: 1 | Status: SHIPPED | OUTPATIENT
Start: 2025-02-24

## 2025-02-24 RX ORDER — ACYCLOVIR 50 MG/G
OINTMENT TOPICAL
Qty: 15 G | Refills: 2 | Status: CANCELLED | OUTPATIENT
Start: 2025-02-24

## 2025-02-24 RX ORDER — ACYCLOVIR 50 MG/G
CREAM TOPICAL
Qty: 5 G | Refills: 2 | Status: CANCELLED | OUTPATIENT
Start: 2025-02-24

## 2025-02-24 RX ORDER — ACYCLOVIR 50 MG/G
OINTMENT TOPICAL
Qty: 15 G | Refills: 2 | Status: SHIPPED | OUTPATIENT
Start: 2025-02-24

## 2025-02-24 RX ORDER — VALACYCLOVIR HYDROCHLORIDE 1 G/1
TABLET, FILM COATED ORAL
Qty: 4 TABLET | Refills: 2 | Status: SHIPPED | OUTPATIENT
Start: 2025-02-24

## 2025-02-24 NOTE — PROGRESS NOTES
Subjective     Colleen Salomon is a 24 y.o. female who presents for the following: Acne (Pt states improving. Taking spirolactone and using azelaic acid & dapsone with good response. ) and Mouth Lesions (Pt states has a cold sore on lip, comes more frequently, every 2 months. Using otc liquid for treatment with no response. ).     Review of Systems:  No other skin or systemic complaints other than what is documented elsewhere in the note.    The following portions of the chart were reviewed this encounter and updated as appropriate:   Tobacco  Allergies  Meds  Problems  Med Hx  Surg Hx  Fam Hx                  Objective   Well appearing patient in no apparent distress; mood and affect are within normal limits.    A focused skin examination was performed. All findings within normal limits unless otherwise noted below.    Assessment/Plan   1. Acne vulgaris  Head - Anterior (Face)  Two resolving macules/PIH    Improved but inadequately controlled  -Recommend to continue topical azelaic acid 15% once daily  -Recommend to continue topical dapsone 7.5% once daily  -Recommend to increase dose of spironolactone from to 100 once daily as tolerated. Patient tolerating well without side effects.  -Patient may consider starting Vettered Retinoid 1-2 times per week    Prior History:  Inflammatory acne, likely with hormonal component (worsens prior to menses, recalcitrant to therapy). Patient had acne in high school, which was well controlled with azelaic acid/clindamycin/niacin previously. Then acne recurred a few years ago, and patient tried BP wash, clindamycin lotion and tretinoin 0.025% for greater than 1 year without response, then tried reduced strength tretinoin 0.018% and this only resulted in worsening of acne.  For the past few months patient has been on azelaic acid/clinda/zinc pyrithione from curology with mild improvement but still with significant active acne.  -Discussed the nature of the  condition  -Discussed treatment options  -Avoid tretinoin in this patient given past intolerance and ineffectiveness    -Discussed/information given on the potential adverse effects of spironolactone including but not limited to hypotension, diuresis, muscle cramps, fatigue, breast tenderness, menstrual irregularities, hyperkalemia which may result in muscle dysfunction (although current evidence suggests no need to monitor potassium levels in young healthy adult females).   -Discussed common side effects of lightheadedness or dizziness, which usually resolve within a few days as the body adjusts to the medication if fluid intake is appropriate. Discussed if the patient is feeling unwell or fainting, to discontinue the medication and contact our office.  -Discussed not to take any prescription potassium supplements while taking spironolactone, and to avoid excessive consumption of food/drink containing potassium (such as coconut water).  -There is a black box warning of tumorigenesis in rodents taking very high doses of spironolactone. Epidemiologic studies have shown no increase in malignancy in humans.   -If patient is of child bearing potential, patient counseled that they should NOT get pregnant while on this medication as there is risk of teratogenicity/birth defects and needs to use contraception while taking this medication      Related Medications  azelaic acid (Finacea) 15 % gel  Apply to affected areas once daily    dapsone 7.5 % gel with pump  Apply to affected areas once daily    spironolactone (Aldactone) 50 mg tablet  Take one and a half pills (75mg) by mouth at bedtime as tolerated    spironolactone (Aldactone) 100 mg tablet  Take one pill by mouth at bedtime as tolerated    2. Herpes labialis  Mid Upper Vermilion Lip  Vesicle(s) on an erythematous base    -Discussed nature of diagnosis  -Reviewed nature of condition related to viral infection and contagious nature  -Recommend to be rx  Valacyclovir/Valtrex orally as well as acyclovir ointment    valACYclovir (Valtrex) 1 gram tablet - Mid Upper [place] Lip  Take 2 pills by mouth at symptom onset, then take 2 pills by mouth 12 hours later.    Related Medications  acyclovir (Zovirax) 5 % ointment  Apply to affected area five times per day for 4 days. Start at symptom onset.    3. Dyschromia (5)  Left Knee - Anterior, Right 2nd Finger Proximal Interphalangeal Joint, Right 3rd Finger Proximal Interphalangeal Joint, Right 4th Finger Proximal Interphalangeal Joint, Right Knee - Anterior  Mildly hyperpigmented macules/patches overlying joints    -Discussed the nature of condition, that this is physiologic and is very challenging to treat  -Treatment will require strict avoidance of the sun and sun protection with SPF at least 50 on a daily basis  -Recommend acceptance of this normal skin variation of darkening of the skin over joints  -May trial urea cream 20% BID (Cetaphil) to see if this is helpful      4. Dermatologic problem    Related Procedures  Follow Up In Dermatology - Established Patient        Follow up in 6 months for acne  Discussed if there are any changes or development of concerning symptoms (lesion/skin condition is changing, bleeding, enlarging, or worsening) the patient is to contact my office. The patient verbalizes understanding.    Shey Grover MD  2/24/2025

## 2025-08-18 ENCOUNTER — APPOINTMENT (OUTPATIENT)
Dept: DERMATOLOGY | Facility: CLINIC | Age: 25
End: 2025-08-18
Payer: COMMERCIAL

## 2025-10-13 ENCOUNTER — APPOINTMENT (OUTPATIENT)
Dept: DERMATOLOGY | Facility: CLINIC | Age: 25
End: 2025-10-13
Payer: COMMERCIAL